# Patient Record
(demographics unavailable — no encounter records)

---

## 2025-06-20 NOTE — REVIEW OF SYSTEMS
"[facility]  [id number] [facility] [address]  [phone]  Dept: [phone]    PRE-OP EVALUATION:  Today's date: 2019    Albina Pedro (: 1948) presents for pre-operative evaluation assessment as requested by Dr. Joseph Oconnor.  She requires evaluation and anesthesia risk assessment prior to undergoing surgery/procedure for treatment of fibula fracture right .    Proposed Surgery/ Procedure:OPEN REDUCTION INTERNAL FIXATION Lateral Malleolus Fracture   Date of Surgery/ Procedure: 2019  Time of Surgery/ Procedure: 10:40 am  Hospital/Surgical Facility: [facility]  Primary Physician: Carolina Burrell  Type of Anesthesia Anticipated: Combined General with popliteal block    Patient has a Health Care Directive or Living Will:  NO  - advanced directive given today    1. NO - Do you have a history of heart attack, stroke, stent, bypass or surgery on an artery in the head, neck, heart or legs?  2. NO - Do you ever have any pain or discomfort in your chest?  3. NO - Do you have a history of  Heart Failure?  4. YES, ongoing issue - Are you troubled by shortness of breath when: walking on the level, up a slight hill or at night?  5. NO - Do you currently have a cold, bronchitis or other respiratory infection?  6. NO - Do you have a cough, shortness of breath or wheezing?  7. NO - Do you sometimes get pains in the calves of your legs when you walk?  8. NO - Do you or anyone in your family have previous history of blood clots?  9. NO - Do you or does anyone in your family have a serious bleeding problem such as prolonged bleeding following surgeries or cuts?  10. NO - Have you ever had problems with anemia or been told to take iron pills?  11. NO - Have you had any abnormal blood loss such as black, tarry or bloody stools, or abnormal vaginal bleeding?  12. NO - Have you ever had a blood transfusion?  13. YES, \"loopy\" after anesthesia the last surgery - Have you or any of " your relatives ever had problems with anesthesia?  14. NO - Do you have sleep apnea, excessive snoring or daytime drowsiness?  15. NO - Do you have any prosthetic heart valves?  16. NO - Do you have prosthetic joints?  17. NO - Is there any chance that you may be pregnant?      HPI:     HPI related to upcoming procedure: fall down steps a couple weeks ago. Closed displaced fracture of lateral malleolus of right fibula      See problem list for active medical problems.  Problems all longstanding and stable, except as noted/documented.  See ROS for pertinent symptoms related to these conditions.                                                                                                                                                          .  CAD - Patient has a longstanding history of moderate-severe CAD. Patient denies recent chest pain or NTG use, denies exercise induced dyspnea or PND. Last Stress test 12/2016 - stable, EKG 5/4/18.         From cardiology visit 5/2018  1. CAD with angina (Cath 2016 - moderate mLAD/D1 stenosis, small vessels not ideal for PCI, lexiscan - small ant ischemia, normal LV fxn): continue Ranolazine 1000 mg BID for angina. Intolerant of other anti-anginals. Continue pravastatin 20 mg and ASA 81 mg for secondary prevention. Will do repeat EKG in one year to monitor QTc.   2. Syncope related to medications.  3. ILD  4. Sjogren's syndrome                                                                                                                                                 .  ILD- Patient has a longstanding history of moderate-severe COPD . Patient has been doing well overall noting CHAVARRIA and continues on medication regimen consisting of albuterol PRN  without adverse reactions or side effects.   Stable, no changes. 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 Not on treatment currently, previously on azathioprine (years ago). Also has sjogren's disease not on treatment. PFTs today are stable from prior, and have been stable for several years.   -Continue to follow intermittently, can schedule follow up in 1 year with PFTs, can be seen sooner if needed.  -Consider repeat CT scan as a 5 year follow up, in December 2019 or so.                                                                                                           .  HYPERLIPIDEMIA - Patient has a long history of significant Hyperlipidemia requiring medication for treatment with recent good control. Patient reports no problems or side effects with the medication.                                                                      Depression: wellbutrin/lorazepam PRN. Stable.                                                                                   .    MEDICAL HISTORY:     Patient Active Problem List    Diagnosis Date Noted     Chronic stable angina (H) 01/18/2019     Priority: Medium     Intermediate coronary syndrome (H) 07/24/2018     Priority: Medium     ILD (interstitial lung disease) (H) 07/09/2018     Priority: Medium     Needs follow up ct dec 2019       Hyperlipidemia LDL goal <70 05/31/2018     Priority: Medium     Major depressive disorder, recurrent, mild (H) 02/01/2017     Priority: Medium     Acute chest pain 12/13/2016     Priority: Medium     Status post coronary angiogram 02/18/2016     Priority: Medium     Adverse effect of anesthetic 02/11/2016     Priority: Medium     Patient is very sensitive to anesthetics. Needs lower dosing.        Sjogren's syndrome (H) 01/15/2014     Priority: Medium     CAD (coronary artery disease) 09/18/2013     Priority: Medium     Mild ischemia on stress test       Advanced directives, counseling/discussion 01/12/2012     Priority: Medium     Advance Directive Problem List Overview:   Name Relationship Phone    Primary Health Care Agent             Alternative Health Care Agent          Discussed advance care planning with patient; information given to patient to review.     Daija Black CMA, HC Facilitator    2012          Panniculitis 11/10/2011     Priority: Medium     Health Care Home 06/15/2011     Priority: Medium     X  DX V65.8 REPLACED WITH [facility] (2013)       Episodic mood disorder (H) 2007     Priority: Medium     2007 - Prozac and will look into light box.   April 15, 2008 - Will stop for summer. Light box rx.  Problem list name updated by automated process. Provider to review       Dermatophytosis of body 2007     Priority: Medium     2007 - Classic appearance and worsening in areas's not using Nystatin.  Will use everywhere or change to clotrimazole.   April 15, 2008 - Change to powder.   Problem list name updated by automated process. Provider to review       DIZZINESS - LIKELY HYPOGLYCEMIA 2006     Priority: Medium     2008- negative Event monitor and GTT showed some elevation.  Dietary changes.           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     Past Surgical History:   Procedure Laterality Date     C/SECTION, LOW TRANSVERSE  10/13/1978    , Low Transverse     COLONOSCOPY       SURGICAL HISTORY OF -            SURGICAL HISTORY OF -   00    Colonoscopy     Current Outpatient Medications   Medication Sig Dispense Refill     aspirin 81 MG tablet Take 81 mg by mouth every evening  30 tablet      buPROPion (WELLBUTRIN SR) 100 MG 12 hr tablet TAKE ONE TABLET BY MOUTH TWICE DAILY  (Patient taking differently: TAKE ONE TABLET BY MOUTH ONCE DAILY) 180 tablet 2     calcium  carbonate (OS-SARI 500 MG [place]. [place]) 1250 MG tablet Take 1 tablet by mouth 2 times daily       metoprolol succinate ER (TOPROL-XL) 25 MG 24 hr tablet Take 1 tablet (25 mg) by mouth daily 14 tablet 0     Multiple Vitamins-Minerals (MULTIVITAMIN & MINERAL PO) Take 1 tablet by mouth daily        nystatin (MYCOSTATIN) 535361 UNIT/GM external cream Apply topically 2 times daily 30 g 1     pravastatin (PRAVACHOL) 20 MG tablet TAKE ONE TABLET EVERY DAY 90 tablet 2     Ranolazine 1000 MG TB12 Take 1,000 mg by mouth 2 times daily 180 tablet 3     venlafaxine (EFFEXOR) 37.5 MG tablet TAKE TWO TABLETS BY MOUTH IN THE MORNING AND ONE IN THE AFTERNOON (Patient taking differently: TAKE TWO TABLETS BY MOUTH ONE IN THE MORNING AND ONE IN THE AFTERNOON) 270 tablet 0     vitamin B-12 (CYANOCOBALAMIN) 2500 MCG sublingual tablet Take 2,500 mcg by mouth daily       VITAMIN D3 -2000 units by mouth DAILY       albuterol (PROAIR HFA/PROVENTIL HFA/VENTOLIN HFA) 108 (90 BASE) MCG/ACT Inhaler Inhale 2 puffs into the lungs every 4 hours as needed for shortness of breath / dyspnea 3 Inhaler 6     loratadine (CLARITIN) 10 MG tablet Take 1 tablet (10 mg) by mouth daily 30 tablet 1     LORazepam (ATIVAN) 0.5 MG tablet TAKE ONE TABLET BY MOUTH EVERY EIGHT HOURS AS NEEDED FOR ANXIETY  30 tablet 0     mineral oil-hydrophilic petrolatum (AQUAPHOR) ointment Apply  topically as needed for dry skin. 420 g 4     triamcinolone (KENALOG) 0.1 % cream Apply sparingly to affected area three times daily for 14 days. 30 g 1     OTC products: None, except as noted above    Allergies   Allergen Reactions     Daypro [Oxaprozin] Rash            Lidocaine Other (See Comments)     Rapid heart rate     Nitroglycerin Other (See Comments)     Causes low blood pressure     Penicillins Unknown     Occurred as child.     Sulfa Drugs Rash      Latex Allergy: NO    Social History     Tobacco Use     Smoking status: Never Smoker     Smokeless tobacco: Never Used    Substance Use Topics     Alcohol use: Yes     Alcohol/week: 0.0 oz     Comment: 1 glass of wine every 2 weeks     History   Drug Use No       REVIEW OF SYSTEMS:   CONSTITUTIONAL: NEGATIVE for fever, chills, change in weight  INTEGUMENTARY/SKIN: NEGATIVE for worrisome rashes, moles or lesions POSITIVE yeast under breasts. Ongoing intermittent  EYES: NEGATIVE for vision changes or irritation  ENT/MOUTH: NEGATIVE for ear, mouth and throat problems  RESP: NEGATIVE for significant cough or SOB  BREAST: NEGATIVE for masses, tenderness or discharge  CV: NEGATIVE for chest pain, palpitations or peripheral edema POSITIVE ankle swelling right leg  GI: NEGATIVE for nausea, abdominal pain, heartburn, or change in bowel habits  : NEGATIVE for frequency, dysuria, or hematuria  MUSCULOSKELETAL: NEGATIVE for significant arthralgias or myalgia POSITIVE right foot/ankle  NEURO: NEGATIVE for weakness, dizziness or paresthesias  ENDOCRINE: NEGATIVE for temperature intolerance, skin/hair changes   HEME: NEGATIVE for bleeding problems  PSYCHIATRIC: NEGATIVE for changes in mood or affect    EXAM:   /65   Pulse 77   Temp 97.3  F (36.3  C) (Tympanic)   Resp 15   SpO2 95%     GENERAL APPEARANCE: healthy, alert and no distress     EYES: EOMI, PERRL     HENT: ear canals and TM's normal and nose and mouth without ulcers or lesions     NECK: no adenopathy, no asymmetry, masses, or scars and thyroid normal to palpation     RESP: lungs clear to auscultation - no rales, rhonchi or wheezes     CV: regular rates and rhythm, normal S1 S2, no S3 or S4 and no murmur, click or rub     ABDOMEN:  soft, nontender, no HSM or masses and bowel sounds normal     MS: POSITIVE right lower leg in boot. 1+ pitting edema right anterior lower leg, no calf swelling/tenderness     SKIN: no suspicious lesions or rashes     NEURO: Normal strength and tone, sensory exam grossly normal, mentation intact and speech normal     PSYCH: mentation appears normal.  and affect normal/bright     LYMPHATICS: No cervical adenopathy    DIAGNOSTICS:     EKG: nonspecific T abnormality/precordial negative T waves, normal axis, normal intervals, no acute ST/T changes c/w ischemia, no LVH by voltage criteria  Labs Resulted Today:   Results for orders placed or performed during the hospital encounter of 02/10/19   Ankle XR, G/E 3 views, right    Narrative    RIGHT ANKLE THREE VIEWS  2/10/2019 11:50 AM     HISTORY: drage    COMPARISON: None      Impression    IMPRESSION: Mildly displaced distal fibular fracture and mild widening  of the ankle mortise.    SYD GUERRA MD     Labs Drawn and in Process:   Unresulted Labs Ordered in the Past 30 Days of this Admission     Date and Time Order Name Status Description    2/18/2019 1705 VITAMIN B12 In process     2/18/2019 1704 HEMOGLOBIN In process     2/18/2019 1704 BASIC METABOLIC PANEL In process           Recent Labs   Lab Test 10/22/18  1645 05/31/18  1133 10/20/17  2313   HGB 12.7  --  12.2     --  256    140 139   POTASSIUM 4.1 4.4 4.1   CR 0.71 0.61 0.94        IMPRESSION:   Reason for surgery/procedure: Closed displaced fracture of lateral malleolus of right fibula / OPEN REDUCTION INTERNAL FIXATION Lateral Malleolus Fracture  Diagnosis/reason for consult: preoperative exam    The proposed surgical procedure is considered INTERMEDIATE risk.    REVISED CARDIAC RISK INDEX  The patient has the following serious cardiovascular risks for perioperative complications such as (MI, PE, VFib and 3  AV Block):  Coronary Artery Disease (MI, positive stress test, angina, Qs on EKG)  INTERPRETATION: 1 risks: Class II (low risk - 0.9% complication rate)    The patient has the following additional risks for perioperative complications:  The ASCVD Risk score ([place] LEMUS Jr., et al., 2013) failed to calculate for the following reasons:    Cannot find a previous HDL lab    Cannot find a previous total cholesterol lab      ICD-10-CM    1. Preop  general physical exam Z01.818 EKG 12-lead complete w/read - Clinics     Hemoglobin   2. 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 Coronary artery disease involving native coronary artery of native heart without angina pectoris I25.10 EKG 12-lead complete w/read - Clinics     Basic metabolic panel     metoprolol succinate ER (TOPROL-XL) 25 MG 24 hr tablet   4. Status post coronary angiogram Z98.890    5. ILD (interstitial lung disease) (H) J84.9    6. Chronic stable angina (H) I20.8    7. Intermediate coronary syndrome (H) I20.0    8. Intertrigo L30.4 nystatin (MYCOSTATIN) 051948 UNIT/GM external cream   9. Vitamin B12 deficiency (non anemic) E53.8 Vitamin B12       RECOMMENDATIONS:     --Consult hospital rounder / IM to assist post-op medical management    Cardiovascular Risk  Performs 4 METs exercise without symptoms (Light housework (dusting, washing dishes)) .   Patient to start a Beta Blocker. Continue Betablocker therapy after surgery, using Beta blocker order set as necessary for NPO status.  Toprol XL 25 mg started x 14 days  Discussed this with Dr. Sandoval who agreed with plan.      Pulmonary Risk  Incentive spirometry post op  Respiratory Therapy (Respiratory Care IP Consult)  post op  NG tube decompression if abdominal distension or significant vomiting       --Patient is to take all scheduled medications on the day of surgery EXCEPT for modifications listed below.    APPROVAL GIVEN to proceed with proposed procedure, without further diagnostic evaluation     Note: labs were obtained (hgb, bmp) but results will not come til tomorrow, anesthesiology can review these.  Patient requested B12 be checked, put on oral B12 by [facility] in December for memory, recommended recheck in a couple months    Signed Electronically by: Monika Castro PA-C    Copy of this evaluation report is provided to requesting physician.    [place] Preop  Guidelines    Revised Cardiac Risk Index   [As Noted in HPI] : as noted in HPI [Negative] : Heme/Lymph

## 2025-06-20 NOTE — HISTORY OF PRESENT ILLNESS
[FreeTextEntry1] : 74 y/o M referred by Dr Meredith and last seen here in 2021. He has a PMHx of AAA s/p EVAR with Dr Brunson in 2019. Other PMHx includes BPH, anxiety/depression, GERD, HLD, HTN, mitral valve insuff, lumbar radiculopathy, brain aneurysm s/p intervention, and he is a former smoker.  Today, he presents w/ concerns of abdominal pain.

## 2025-06-20 NOTE — ASSESSMENT
[FreeTextEntry1] : 76 y/o M w/ hx of AAA s/p EVAR (2019 w/ Dr Brunson). On exam, Abd mesenteric duplex showed Findings discussed with pt and recommend  [Arterial/Venous Disease] : arterial/venous disease [Aneurysm Surgery] : aneurysm surgery

## 2025-06-20 NOTE — PHYSICAL EXAM
[JVD] : no jugular venous distention  [Respiratory Effort] : normal respiratory effort [No Rash or Lesion] : No rash or lesion [Alert] : alert [Oriented to Person] : oriented to person [Oriented to Place] : oriented to place [Oriented to Time] : oriented to time [Calm] : calm [de-identified] : WN/WD [de-identified] : FROM